# Patient Record
Sex: FEMALE | Race: OTHER | NOT HISPANIC OR LATINO | URBAN - METROPOLITAN AREA
[De-identification: names, ages, dates, MRNs, and addresses within clinical notes are randomized per-mention and may not be internally consistent; named-entity substitution may affect disease eponyms.]

---

## 2023-10-04 ENCOUNTER — EMERGENCY (EMERGENCY)
Facility: HOSPITAL | Age: 28
LOS: 0 days | Discharge: HOME | End: 2023-10-04
Attending: EMERGENCY MEDICINE
Payer: MEDICAID

## 2023-10-04 VITALS
WEIGHT: 119.93 LBS | SYSTOLIC BLOOD PRESSURE: 107 MMHG | TEMPERATURE: 99 F | RESPIRATION RATE: 18 BRPM | OXYGEN SATURATION: 98 % | HEIGHT: 61 IN | DIASTOLIC BLOOD PRESSURE: 68 MMHG | HEART RATE: 91 BPM

## 2023-10-04 VITALS
DIASTOLIC BLOOD PRESSURE: 59 MMHG | RESPIRATION RATE: 18 BRPM | HEART RATE: 80 BPM | SYSTOLIC BLOOD PRESSURE: 109 MMHG | TEMPERATURE: 99 F | OXYGEN SATURATION: 100 %

## 2023-10-04 DIAGNOSIS — O99.341 OTHER MENTAL DISORDERS COMPLICATING PREGNANCY, FIRST TRIMESTER: ICD-10-CM

## 2023-10-04 DIAGNOSIS — F32.A DEPRESSION, UNSPECIFIED: ICD-10-CM

## 2023-10-04 DIAGNOSIS — O00.90 UNSPECIFIED ECTOPIC PREGNANCY WITHOUT INTRAUTERINE PREGNANCY: ICD-10-CM

## 2023-10-04 DIAGNOSIS — Z90.49 ACQUIRED ABSENCE OF OTHER SPECIFIED PARTS OF DIGESTIVE TRACT: ICD-10-CM

## 2023-10-04 DIAGNOSIS — J45.909 UNSPECIFIED ASTHMA, UNCOMPLICATED: ICD-10-CM

## 2023-10-04 DIAGNOSIS — Z90.79 ACQUIRED ABSENCE OF OTHER GENITAL ORGAN(S): ICD-10-CM

## 2023-10-04 DIAGNOSIS — O99.511 DISEASES OF THE RESPIRATORY SYSTEM COMPLICATING PREGNANCY, FIRST TRIMESTER: ICD-10-CM

## 2023-10-04 DIAGNOSIS — O20.9 HEMORRHAGE IN EARLY PREGNANCY, UNSPECIFIED: ICD-10-CM

## 2023-10-04 DIAGNOSIS — F41.9 ANXIETY DISORDER, UNSPECIFIED: ICD-10-CM

## 2023-10-04 LAB
ALBUMIN SERPL ELPH-MCNC: 4.7 G/DL — SIGNIFICANT CHANGE UP (ref 3.5–5.2)
ALP SERPL-CCNC: 43 U/L — SIGNIFICANT CHANGE UP (ref 30–115)
ALT FLD-CCNC: 16 U/L — SIGNIFICANT CHANGE UP (ref 0–41)
ANION GAP SERPL CALC-SCNC: 8 MMOL/L — SIGNIFICANT CHANGE UP (ref 7–14)
APPEARANCE UR: ABNORMAL
AST SERPL-CCNC: 17 U/L — SIGNIFICANT CHANGE UP (ref 0–41)
BASOPHILS # BLD AUTO: 0.04 K/UL — SIGNIFICANT CHANGE UP (ref 0–0.2)
BASOPHILS NFR BLD AUTO: 0.7 % — SIGNIFICANT CHANGE UP (ref 0–1)
BILIRUB SERPL-MCNC: 0.3 MG/DL — SIGNIFICANT CHANGE UP (ref 0.2–1.2)
BILIRUB UR-MCNC: NEGATIVE — SIGNIFICANT CHANGE UP
BLD GP AB SCN SERPL QL: SIGNIFICANT CHANGE UP
BUN SERPL-MCNC: 6 MG/DL — LOW (ref 10–20)
CALCIUM SERPL-MCNC: 8.7 MG/DL — SIGNIFICANT CHANGE UP (ref 8.4–10.5)
CHLORIDE SERPL-SCNC: 105 MMOL/L — SIGNIFICANT CHANGE UP (ref 98–110)
CO2 SERPL-SCNC: 23 MMOL/L — SIGNIFICANT CHANGE UP (ref 17–32)
COLOR SPEC: ABNORMAL
CREAT SERPL-MCNC: 0.7 MG/DL — SIGNIFICANT CHANGE UP (ref 0.7–1.5)
DIFF PNL FLD: ABNORMAL
EGFR: 121 ML/MIN/1.73M2 — SIGNIFICANT CHANGE UP
EOSINOPHIL # BLD AUTO: 0.2 K/UL — SIGNIFICANT CHANGE UP (ref 0–0.7)
EOSINOPHIL NFR BLD AUTO: 3.6 % — SIGNIFICANT CHANGE UP (ref 0–8)
EPI CELLS # UR: PRESENT
GLUCOSE SERPL-MCNC: 95 MG/DL — SIGNIFICANT CHANGE UP (ref 70–99)
GLUCOSE UR QL: NEGATIVE MG/DL — SIGNIFICANT CHANGE UP
HCG SERPL-ACNC: 1049 MIU/ML — HIGH
HCT VFR BLD CALC: 35.1 % — LOW (ref 37–47)
HGB BLD-MCNC: 12 G/DL — SIGNIFICANT CHANGE UP (ref 12–16)
IMM GRANULOCYTES NFR BLD AUTO: 0.2 % — SIGNIFICANT CHANGE UP (ref 0.1–0.3)
KETONES UR-MCNC: ABNORMAL MG/DL
LEUKOCYTE ESTERASE UR-ACNC: ABNORMAL
LIDOCAIN IGE QN: 17 U/L — SIGNIFICANT CHANGE UP (ref 7–60)
LYMPHOCYTES # BLD AUTO: 0.68 K/UL — LOW (ref 1.2–3.4)
LYMPHOCYTES # BLD AUTO: 12.3 % — LOW (ref 20.5–51.1)
MCHC RBC-ENTMCNC: 33.8 PG — HIGH (ref 27–31)
MCHC RBC-ENTMCNC: 34.2 G/DL — SIGNIFICANT CHANGE UP (ref 32–37)
MCV RBC AUTO: 98.9 FL — SIGNIFICANT CHANGE UP (ref 81–99)
MONOCYTES # BLD AUTO: 0.53 K/UL — SIGNIFICANT CHANGE UP (ref 0.1–0.6)
MONOCYTES NFR BLD AUTO: 9.6 % — HIGH (ref 1.7–9.3)
NEUTROPHILS # BLD AUTO: 4.05 K/UL — SIGNIFICANT CHANGE UP (ref 1.4–6.5)
NEUTROPHILS NFR BLD AUTO: 73.6 % — SIGNIFICANT CHANGE UP (ref 42.2–75.2)
NITRITE UR-MCNC: NEGATIVE — SIGNIFICANT CHANGE UP
NRBC # BLD: 0 /100 WBCS — SIGNIFICANT CHANGE UP (ref 0–0)
PH UR: 6 — SIGNIFICANT CHANGE UP (ref 5–8)
PLATELET # BLD AUTO: 182 K/UL — SIGNIFICANT CHANGE UP (ref 130–400)
PMV BLD: 11.2 FL — HIGH (ref 7.4–10.4)
POTASSIUM SERPL-MCNC: 4.2 MMOL/L — SIGNIFICANT CHANGE UP (ref 3.5–5)
POTASSIUM SERPL-SCNC: 4.2 MMOL/L — SIGNIFICANT CHANGE UP (ref 3.5–5)
PROT SERPL-MCNC: 6.8 G/DL — SIGNIFICANT CHANGE UP (ref 6–8)
PROT UR-MCNC: 30 MG/DL
RBC # BLD: 3.55 M/UL — LOW (ref 4.2–5.4)
RBC # FLD: 11.7 % — SIGNIFICANT CHANGE UP (ref 11.5–14.5)
RBC CASTS # UR COMP ASSIST: 400 /HPF — HIGH (ref 0–4)
SODIUM SERPL-SCNC: 136 MMOL/L — SIGNIFICANT CHANGE UP (ref 135–146)
SP GR SPEC: 1.03 — SIGNIFICANT CHANGE UP (ref 1–1.03)
UROBILINOGEN FLD QL: 1 MG/DL — SIGNIFICANT CHANGE UP (ref 0.2–1)
WBC # BLD: 5.51 K/UL — SIGNIFICANT CHANGE UP (ref 4.8–10.8)
WBC # FLD AUTO: 5.51 K/UL — SIGNIFICANT CHANGE UP (ref 4.8–10.8)
WBC UR QL: 5 /HPF — SIGNIFICANT CHANGE UP (ref 0–5)

## 2023-10-04 PROCEDURE — 96401 CHEMO ANTI-NEOPL SQ/IM: CPT

## 2023-10-04 PROCEDURE — 99284 EMERGENCY DEPT VISIT MOD MDM: CPT

## 2023-10-04 PROCEDURE — 85025 COMPLETE CBC W/AUTO DIFF WBC: CPT

## 2023-10-04 PROCEDURE — 86900 BLOOD TYPING SEROLOGIC ABO: CPT

## 2023-10-04 PROCEDURE — 84702 CHORIONIC GONADOTROPIN TEST: CPT

## 2023-10-04 PROCEDURE — 99284 EMERGENCY DEPT VISIT MOD MDM: CPT | Mod: 25

## 2023-10-04 PROCEDURE — 86901 BLOOD TYPING SEROLOGIC RH(D): CPT

## 2023-10-04 PROCEDURE — 86850 RBC ANTIBODY SCREEN: CPT

## 2023-10-04 PROCEDURE — 76830 TRANSVAGINAL US NON-OB: CPT

## 2023-10-04 PROCEDURE — 83690 ASSAY OF LIPASE: CPT

## 2023-10-04 PROCEDURE — 36415 COLL VENOUS BLD VENIPUNCTURE: CPT

## 2023-10-04 PROCEDURE — 80053 COMPREHEN METABOLIC PANEL: CPT

## 2023-10-04 PROCEDURE — 99282 EMERGENCY DEPT VISIT SF MDM: CPT

## 2023-10-04 PROCEDURE — 81001 URINALYSIS AUTO W/SCOPE: CPT

## 2023-10-04 PROCEDURE — 76830 TRANSVAGINAL US NON-OB: CPT | Mod: 26

## 2023-10-04 RX ORDER — METHOTREXATE 2.5 MG/1
76 TABLET ORAL ONCE
Refills: 0 | Status: COMPLETED | OUTPATIENT
Start: 2023-10-04 | End: 2023-10-04

## 2023-10-04 RX ADMIN — METHOTREXATE 76 MILLIGRAM(S): 2.5 TABLET ORAL at 16:50

## 2023-10-04 NOTE — CONSULT NOTE ADULT - ASSESSMENT
A/P: 29 yo  @5w6d, h/o L salpingectomy, asthma, anxiety/depression, with right adnexal ectopic pregnancy, clinically and hemodynamically stable  -Discussed management options for ectopic pregnancy with patient, including surgical management with salpingectomy and medical management with Methotrexate.   -Explained that surgical management is the definitive treatment, but medical management may be acceptable in some patients. Discussed that surgical management would like to sterilization due to patient's history of L salpingectomy, and she would need to undergo IVF to achieve additional pregnancies in the future. Discussed that without surgery this is a risk for ectopic rupture with severe morbidity and mortality.   -Explained that methotrexate may be considered in select patients. Discussed that medication management requires close follow up and patient compliance is necessary. Discussed that even with medical management, patient's fallopian tube is likely damaged and she will be at high risk for ectopic pregnancy in the future. Discussed that medical management may fail and patient may need surgical management in the future. Discussed that a second dose of methotrexate is necessary in some patients to achieve therapeutic effect.   -Discussed side effects of methotrexate, need to avoid pregnancy for 3 mo after administration.  -Methotrexate agreement form signed  -Methotrexate consent signed  -Pt to repeat HCG on 10/7 (day 4) and 10/10 (day 7)  -ectopic precautions discussed  -recommend patient re-start paroxetine    INCOMPLETE    Dr. Mayo and Dr. Leach aware A/P: 27 yo  @5w6d, h/o L salpingectomy, asthma, anxiety/depression, with right adnexal ectopic pregnancy, clinically and hemodynamically stable  -Discussed management options for ectopic pregnancy with patient, including surgical management with salpingectomy and medical management with Methotrexate.   -Explained that surgical management is the definitive treatment, but medical management may be acceptable in some patients. Discussed that surgical management would like to sterilization due to patient's history of L salpingectomy, and she would need to undergo IVF to achieve additional pregnancies in the future. Discussed that without surgery this is a risk for ectopic rupture with severe morbidity and mortality.   -Explained that methotrexate may be considered in select patients. Discussed that medication management requires close follow up and patient compliance is necessary. Discussed that even with medical management, patient's fallopian tube is likely damaged and she will be at high risk for ectopic pregnancy in the future. Discussed that medical management may fail and patient may need surgical management in the future. Discussed that a second dose of methotrexate is necessary in some patients to achieve therapeutic effect.   -Discussed side effects of methotrexate, need to avoid pregnancy for 3 mo after administration.  -Methotrexate agreement form signed  -Methotrexate consent signed  -76mg IM methotrexate ordered, please administer  -Pt to repeat HCG on 10/7 (day 4) and 10/10 (day 7)  -ectopic precautions discussed  -recommend patient re-start paroxetine  -pt to follow up at [facility] in 2-3 weeks      Dr. Mayo and Dr. Leach aware

## 2023-10-04 NOTE — ED PROVIDER NOTE - PHYSICAL EXAMINATION
VITAL SIGNS: I have reviewed nursing notes and confirm.  CONSTITUTIONAL: Patient is in no acute distress.  SKIN: Skin exam is warm and dry, no acute rash.  HEAD: Normocephalic; atraumatic.  EYES: EOM intact; conjunctiva and sclera clear.  ENT: No nasal discharge; airway clear.    CARD: S1, S2 normal; Regular rate and rhythm.  RESP: Clear to auscultation bilaterally. No wheezes, rales or rhonchi.  ABD: Soft; non-distended; non-tender.   EXT: Normal ROM. No edema.   NEURO: Alert, oriented. Grossly unremarkable. No focal deficits.  PSYCH: Cooperative, appropriate.

## 2023-10-04 NOTE — ED PROVIDER NOTE - OBJECTIVE STATEMENT
29 yo F A1 with pmh of ectopic pregnancy, endometriosis and asthma presents to the ER with c.o vaginal bleeding x . Reports she went to urgent care today due to right sided pelvic cramping with vaginal bleeding, was told she is pregnant. Has changed 2 pads today. States she typically gets her menses at the end of every month. Does not take OCP, no IUD. Pt reports hx of ectopic pregnancy 2-3 yrs ago. Denies dysuria, fever, chills, vomiting or other complaints. 27 yo  @5w6d by LMP , with h/o left salpingectomy for ruptured ectopic (3 yrs ago at [facility]), h/o cholecystectomy, asthma, anxiety and depression, presents to ED for vaginal bleeding x . Reports she went to urgent care today due to right sided pelvic cramping with vaginal bleeding, was told she is pregnant. Has changed 2 pads today. States she typically gets her menses at the end of every month. Does not take OCP, no IUD. Pt reports hx of ectopic pregnancy 2-3 yrs ago. Denies dysuria, fever, chills, vomiting or other complaints.

## 2023-10-04 NOTE — ED PROVIDER NOTE - CLINICAL SUMMARY MEDICAL DECISION MAKING FREE TEXT BOX
28-year-old female history of prior ectopic pregnancy, , presenting for evaluation of vaginal bleeding with associated lower abdominal cramping.  No other acute complaints.  Well appearing, NAD, non toxic. NCAT PERRLA EOMI neck supple non tender normal wob cta bl rrr abdomen s nt nd no rebound no guarding WWPx4 neuro non focal.  Diarrhea.  Status post OB/GYN evaluation.  Methotrexate given.  Aware of all results, given a copy of all available results, comfortable with discharge and follow-up outpatient, strict return precautions given. Endorses understanding and aware they can return at any time for further evaluation. No further questions or concerns at this time.

## 2023-10-04 NOTE — CONSULT NOTE ADULT - ATTENDING COMMENTS
27 y/o  @ 5w6d with history of prior ectopic pregnancy s/p left salpingectomy, currently with right sided ectopic pregnancy, stable. Patient counselled on her options and strongly desires to preserve her fallopian tube for future fertility. Risks/benefits discussed with patient. Will administer methotrexate and trend beta hcg to monitor for resolution of the ectopic. Patient is aware that even if medical management is successful, she will still be at risk of another ectopic. Patient understands and agreed. Follow up for serial beta hCG as instructed. Strict precautions discussed.

## 2023-10-04 NOTE — ED PROVIDER NOTE - NS ED ROS FT
Review of Systems:  	•	CONSTITUTIONAL - no fever, no diaphoresis, no chills  	•	SKIN - no rash  	•	HEMATOLOGIC - no bleeding, no bruising  	•	EYES - no eye pain, no blurry vision  	•	ENT - no congestion  	•	RESPIRATORY - no shortness of breath, no cough  	•	CARDIAC - no chest pain, no palpitations  	•	GI - no abd pain, no nausea, no vomiting, no diarrhea   	•	GENITO-URINARY - no dysuria; no hematuria, +Vaginal bleeding  	•	MUSCULOSKELETAL - no joint paint, no swelling, no redness  	•	NEUROLOGIC - no weakness, no headache   	•	PSYCH - no anxiety, no depression  	All other ROS are negative except as documented in HPI.

## 2023-10-04 NOTE — CONSULT NOTE ADULT - SUBJECTIVE AND OBJECTIVE BOX
PGY2 Note  Chief Complaint: vaginal bleeding    HPI: 27 yo  @5w6d by LMP , with h/o left salpingectomy for ruptured ectopic, h/o cholecystectomy, asthma, anxiety and depression, presents to ED for vaginal bleeding. Pt reports she began having vaginal bleeding on . She went to urgent care today due to mild abdominal cramping and vaginal bleeding thought to be her period. There she was told she was pregnant and referred to ED. Pt reports abdominal pain began 3 days ago, mild, crampy with localization to the right side. Pain improved with rest, aggregated by movement. Denies fever, chills, N/V, diarrhea, constipation, dizziness, syncope. Pt reports regular periods. Reports h/o ruptured left ectopic pregnancy with emergency laparoscopy and salpingectomy approximately 3 years ago in [place]. Pt reports she follow with [facility] in [place], visiting her boyfriend on [place] today. Denies h/o fibroids, cysts, STI, abnormal pap. Reports h/o asthma, poorly controlled. Uses albuterol 4x per day, h/o hospitalization x1 without intubation. Reports h/o anxiety/depression, taking Paroxetine 10mg daily, however self-discontinued medication 3 days ago.     Denies the following: constitutional symptoms, visual symptoms, cardiovascular symptoms, respiratory symptoms, GI symptoms, musculoskeletal symptoms, skin symptoms, neurologic symptoms, hematologic symptoms, allergic symptoms, psychiatric symptoms  Except any pertinent positives listed.     Ob/Gyn History:                   LMP -                   Cycle Length - regular  Denies history of ovarian cysts, uterine fibroids, abnormal paps, or STIs    Home Medications:  Albuterol  Paroxetine      Allergies  No Known Allergies  Intolerances    PAST MEDICAL & SURGICAL HISTORY:  H/o left salpingectomy  H/o cholecystectomy  asthma  anxiety/depression    SOCIAL HISTORY: Denies cigarette use, alcohol use, or illicit drug use    Vital Signs Last 24 Hrs  T(F): 98.9 (04 Oct 2023 10:11), Max: 98.9 (04 Oct 2023 10:11)  HR: 91 (04 Oct 2023 10:11) (91 - 91)  BP: 107/68 (04 Oct 2023 10:11) (107/68 - 107/68)  RR: 18 (04 Oct 2023 10:11) (18 - 18)  Height (cm): 154.9 (10-04-23 @ 10:11)  Weight (kg): 54.4 (10-04-23 @ 10:11)  BMI (kg/m2): 22.7 (10-04-23 @ 10:11)  BSA (m2): 1.52 (10-04-23 @ 10:11)    General Appearance - AAOx3, NAD  Abdomen - Soft, mild tenderness with rebound in RLQ, nondistended,, no rigidity, no guarding, bowel sounds present    GYN/Pelvis:  External - normal  Internal -normal vagina and cervix, 100 red blood in cervix  Uterus - 6 week sized, non tender  Adnexa - non palpable and non tender bilaterally    Meds:     Height (cm): 154.9 (10-04-23 @ 10:11)  Weight (kg): 54.4 (10-04-23 @ 10:11)  BMI (kg/m2): 22.7 (10-04-23 @ 10:11)  BSA (m2): 1.52 (10-04-23 @ 10:11)    LABS:                        12.0   5.51  )-----------( 182      ( 04 Oct 2023 11:40 )             35.1     HCG Quantitative, Serum: 1049.0 mIU/mL (10-04-23 @ 11:40)    ABO RH Interpretation: O POS (10-04-23 @ 11:40)  Antibody Screen: NEG (10-04-23 @ 11:40)    10-04    136  |  105  |  6<L>  ----------------------------<  95  4.2   |  23  |  0.7    Ca    8.7      04 Oct 2023 11:40    TPro  6.8  /  Alb  4.7  /  TBili  0.3  /  DBili  x   /  AST  17  /  ALT  16  /  AlkPhos  43  10-04      Urinalysis Basic - ( 04 Oct 2023 11:40 )    Color: Orange / Appearance: Turbid / S.030 / pH: x  Gluc: 95 mg/dL / Ketone: Trace mg/dL  / Bili: Negative / Urobili: 1.0 mg/dL   Blood: x / Protein: 30 mg/dL / Nitrite: Negative   Leuk Esterase: Small / RBC: 400 /HPF / WBC 5 /HPF   Sq Epi: x / Non Sq Epi: x / Bacteria: x    RADIOLOGY & ADDITIONAL STUDIES:  < from: US Transvaginal (10.04.23 @ 12:45) >  PROCEDURE DATE:  10/04/2023          INTERPRETATION:  CLINICAL INFORMATION: Vaginal bleeding.    LMP: Patient undergoing severe vaginal bleeding at this time    COMPARISON: None available.    TECHNIQUE:  Endovaginal pelvic sonogram only. Color and Spectral Doppler was   performed.    FINDINGS:  Uterus: 6.1 x 4.4 x 1.7 cm. Within normal limits.  Endometrium: Gestational sac is seen with a thickened fluid-filled   endometrium.    Right ovary: 2.6 cm x 2.3 cm x 2.5 cm. Within the right adnexa there is a   1.4 cm x 1.6 cm rounded structure with typical sonographic   characteristics consistent with an ectopic pregnancy.  Left ovary: 3.3 cm x 2.5 cm x 2.8 cm. Within normal limits.    Fluid: None.    IMPRESSION:  Right adnexal ectopic pregnancy.

## 2023-10-04 NOTE — ED ADULT NURSE NOTE - OBJECTIVE STATEMENT
Patient reports vaginal spotting. Patient had 2 positive pregnancy test. Patient tested positive today at urgent care. Patient reports lower abdominal cramping.

## 2023-10-04 NOTE — ED PROVIDER NOTE - NSFOLLOWUPINSTRUCTIONS_ED_ALL_ED_FT
Ectopic Pregnancy    WHAT YOU NEED TO KNOW:    Ectopic pregnancy occurs when a fertilized egg attaches and begins to grow outside of the uterus. The most common place for this to happen is in the fallopian tube. This is sometimes called a tubal pregnancy. The egg can also implant on the outside of the uterus, on the ovary or cervix, or in the abdomen. The egg may begin to grow, but the pregnancy cannot continue normally. Ectopic pregnancy can cause heavy bleeding and may be life-threatening.    Ectopic Pregnancy         DISCHARGE INSTRUCTIONS:    Call your local emergency number (911 in the [place]) if:     You have chest pain or trouble breathing.        Call your doctor if:     You have sharp pain in your lower abdomen that is severe and starts suddenly.      You feel lightheaded or like you are going to faint.      You have increasing abdominal or pelvic pain or heavy vaginal bleeding.      You have shoulder pain.      You have a fever.      You have questions or concerns about your condition or care.    Medicines: You may need any of the following:     Prescription pain medicine may be given. Ask your healthcare provider how to take this medicine safely. Some prescription pain medicines contain acetaminophen. Do not take other medicines that contain acetaminophen without talking to your healthcare provider. Too much acetaminophen may cause liver damage. Prescription pain medicine may cause constipation. Ask your healthcare provider how to prevent or treat constipation.       Acetaminophen decreases pain and fever. It is available without a doctor's order. Ask how much to take and how often to take it. Follow directions. Read the labels of all other medicines you are using to see if they also contain acetaminophen, or ask your doctor or pharmacist. Acetaminophen can cause liver damage if not taken correctly. Do not use more than 4 grams (4,000 milligrams) total of acetaminophen in one day.       Take your medicine as directed. Contact your healthcare provider if you think your medicine is not helping or if you have side effects. Tell him or her if you are allergic to any medicine. Keep a list of the medicines, vitamins, and herbs you take. Include the amounts, and when and why you take them. Bring the list or the pill bottles to follow-up visits. Carry your medicine list with you in case of an emergency.    If you received methotrexate:     Do not have sex, and limit physical activity. Heavy physical activity or sexual intercourse may cause the ectopic pregnancy to rupture. This can be life-threatening. Your healthcare provider will tell you when it is okay to have sex and return to your normal activities.      Do not get pregnant until your healthcare provider says it is okay. Methotrexate will be harmful to an unborn baby. You will need to wait until at least 1 monthly cycle after you finish the methotrexate course to get pregnant. Your provider may want you to wait until after you have had 3 monthly cycles. This will help make sure all the methotrexate is out of your body.      Avoid folic acid and NSAID medicines. Folic acid, and NSAIDs, such as ibuprofen, prevent methotrexate from working correctly. Do not take folic acid supplements or have foods that are high in folic acid. Examples include orange juice, breakfast cereal, and leafy green vegetables. Your healthcare provider can give you more information on foods to avoid.      You may have some spotting and abdominal pain. This may start a few days after you begin taking methotrexate. These are normal and should only last a short time. Talk to your healthcare provider if you have any concerns.      Limit sunlight exposure. Sunlight can cause a condition caused methotrexate dermatitis (skin irritation).    For support and more information:     The [employer]  [address] [address]  [place] [address]  Phone: [phone]  Phone: 1-357.860.9559  Web Address: http://www.acog.org      Follow up with your gynecologist as directed: You may need to return for a follow-up exam, treatment, or blood tests. If you received methotrexate to stop your pregnancy, it is important to come in for follow-up tests. Write down your questions so you remember to ask them during your visits.

## 2023-10-04 NOTE — ED PROVIDER NOTE - NS ED ATTENDING STATEMENT MOD
This was a shared visit with the LITA. I reviewed and verified the documentation and independently performed the documented:

## 2023-10-04 NOTE — ED ADULT NURSE NOTE - NSFALLUNIVINTERV_ED_ALL_ED
Bed/Stretcher in lowest position, wheels locked, appropriate side rails in place/Call bell, personal items and telephone in reach/Instruct patient to call for assistance before getting out of bed/chair/stretcher/Non-slip footwear applied when patient is off stretcher/[place] to call system/Physically safe environment - no spills, clutter or unnecessary equipment/Purposeful proactive rounding/Room/bathroom lighting operational, light cord in reach

## 2023-10-04 NOTE — ED PROVIDER NOTE - PATIENT PORTAL LINK FT
You can access the FollowMyHealth Patient Portal offered by [facility] by registering at the following website: [email or web address]. By joining [employer]’s FollowMyHealth portal, you will also be able to view your health information using other applications (apps) compatible with our system.

## 2023-10-04 NOTE — ED ADULT TRIAGE NOTE - CHIEF COMPLAINT QUOTE
Patient reports vaginal bleeding x 1 week, positive pregnancy test today. Patient reports lower abdominal cramping.

## 2023-10-11 NOTE — CHART NOTE - NSCHARTNOTEFT_GEN_A_CORE
PGY 2 note    Called patient to check in following methotrexate administration yesterday for ectopic pregnancy. Pt reports she is doing well, that she has mild right sided cramping intermittently, however no severe pain. Denies heavy vaginal bleeding.     Ectopic precautions reviewed. Patient given call back number [phone] to call for questions or concerns.     Dr. Posada to be aware
PGY2 Note     Spoke with patient regarding bhcg results.  Patient doing well, asymptomatic.  Will repeat bhcg 10/10 (day 7 after MTX)
PGY3 NOTE    Called patient from beta-list, Reports she is doing well, taking tylenol PRN for pain and it is improving. Went for repeat beta-hcg today, will follow up results.
PGY2 note    Patient called L/D around 0100 complaining of pain and vaginal bleeding. She states that her abdominal pain was an 8/10, similar to her pain during her ER visit on 10/4, mild improvement with Tylenol. She also endorses vaginal bleeding, spotting. Patient was told that a complete evaluation could not be done over the phone and recommended she come to the ED for further evaluation. Ectopic precautions given, explained the risks of ruptured ectopic. Patient states she will come to the ED.     Dr. Posada and Dr. Matthews to be aware
PGY3 NOTE    Called patient, reports she is doing well, minimal pain relieved by tylenol. Reports some vaginal bleeding but is not soaking through any pads. Is on her way to the lab to repeat her beta-hcg.
PGY3 NOTE    Spoke with patient on the phone, reports she is doing well. Beta-hcg dropped between day 4 and day 7 by 55.8%, adequate. Will repeat again in 1 week on 10/17. Will fax a script to  [place] [id number] [facility] .

## 2023-10-12 ENCOUNTER — INPATIENT (INPATIENT)
Facility: HOSPITAL | Age: 28
LOS: 0 days | Discharge: HOME | DRG: 545 | End: 2023-10-12
Attending: OBSTETRICS & GYNECOLOGY | Admitting: OBSTETRICS & GYNECOLOGY
Payer: MEDICAID

## 2023-10-12 VITALS
TEMPERATURE: 98 F | HEART RATE: 81 BPM | WEIGHT: 125 LBS | DIASTOLIC BLOOD PRESSURE: 63 MMHG | SYSTOLIC BLOOD PRESSURE: 110 MMHG | OXYGEN SATURATION: 97 % | RESPIRATION RATE: 20 BRPM | HEIGHT: 61 IN

## 2023-10-12 VITALS
RESPIRATION RATE: 18 BRPM | SYSTOLIC BLOOD PRESSURE: 116 MMHG | OXYGEN SATURATION: 99 % | DIASTOLIC BLOOD PRESSURE: 61 MMHG | HEART RATE: 64 BPM

## 2023-10-12 DIAGNOSIS — Z98.890 OTHER SPECIFIED POSTPROCEDURAL STATES: Chronic | ICD-10-CM

## 2023-10-12 DIAGNOSIS — Z90.79 ACQUIRED ABSENCE OF OTHER GENITAL ORGAN(S): Chronic | ICD-10-CM

## 2023-10-12 DIAGNOSIS — N93.8 OTHER SPECIFIED ABNORMAL UTERINE AND VAGINAL BLEEDING: ICD-10-CM

## 2023-10-12 DIAGNOSIS — Z90.49 ACQUIRED ABSENCE OF OTHER SPECIFIED PARTS OF DIGESTIVE TRACT: Chronic | ICD-10-CM

## 2023-10-12 LAB
HCG SERPL-ACNC: 232.5 MIU/ML — HIGH
HCT VFR BLD CALC: 33.6 % — LOW (ref 37–47)
HGB BLD-MCNC: 11.6 G/DL — LOW (ref 12–16)
MCHC RBC-ENTMCNC: 34.5 G/DL — SIGNIFICANT CHANGE UP (ref 32–37)
MCHC RBC-ENTMCNC: 34.6 PG — HIGH (ref 27–31)
MCV RBC AUTO: 100.3 FL — HIGH (ref 81–99)
NRBC # BLD: 0 /100 WBCS — SIGNIFICANT CHANGE UP (ref 0–0)
PLATELET # BLD AUTO: 183 K/UL — SIGNIFICANT CHANGE UP (ref 130–400)
PMV BLD: 10.7 FL — HIGH (ref 7.4–10.4)
RBC # BLD: 3.35 M/UL — LOW (ref 4.2–5.4)
RBC # FLD: 11.9 % — SIGNIFICANT CHANGE UP (ref 11.5–14.5)
WBC # BLD: 9.3 K/UL — SIGNIFICANT CHANGE UP (ref 4.8–10.8)
WBC # FLD AUTO: 9.3 K/UL — SIGNIFICANT CHANGE UP (ref 4.8–10.8)

## 2023-10-12 PROCEDURE — 36415 COLL VENOUS BLD VENIPUNCTURE: CPT

## 2023-10-12 PROCEDURE — 99285 EMERGENCY DEPT VISIT HI MDM: CPT

## 2023-10-12 PROCEDURE — 88305 TISSUE EXAM BY PATHOLOGIST: CPT | Mod: 26

## 2023-10-12 PROCEDURE — 59151 TREAT ECTOPIC PREGNANCY: CPT

## 2023-10-12 PROCEDURE — 76830 TRANSVAGINAL US NON-OB: CPT | Mod: 26

## 2023-10-12 PROCEDURE — C9399: CPT

## 2023-10-12 PROCEDURE — 88305 TISSUE EXAM BY PATHOLOGIST: CPT

## 2023-10-12 PROCEDURE — 80053 COMPREHEN METABOLIC PANEL: CPT

## 2023-10-12 PROCEDURE — 99223 1ST HOSP IP/OBS HIGH 75: CPT | Mod: 25,57

## 2023-10-12 RX ORDER — SODIUM CHLORIDE 9 MG/ML
1000 INJECTION INTRAMUSCULAR; INTRAVENOUS; SUBCUTANEOUS
Refills: 0 | Status: DISCONTINUED | OUTPATIENT
Start: 2023-10-12 | End: 2023-10-12

## 2023-10-12 RX ORDER — OXYCODONE HYDROCHLORIDE 5 MG/1
1 TABLET ORAL
Qty: 16 | Refills: 0
Start: 2023-10-12 | End: 2023-10-15

## 2023-10-12 RX ORDER — HYDROMORPHONE HYDROCHLORIDE 2 MG/ML
0.5 INJECTION INTRAMUSCULAR; INTRAVENOUS; SUBCUTANEOUS
Refills: 0 | Status: DISCONTINUED | OUTPATIENT
Start: 2023-10-12 | End: 2023-10-12

## 2023-10-12 RX ORDER — ACETAMINOPHEN 500 MG
650 TABLET ORAL ONCE
Refills: 0 | Status: DISCONTINUED | OUTPATIENT
Start: 2023-10-12 | End: 2023-10-12

## 2023-10-12 RX ORDER — ALBUTEROL 90 UG/1
2 AEROSOL, METERED ORAL
Refills: 0 | DISCHARGE

## 2023-10-12 RX ORDER — MORPHINE SULFATE 50 MG/1
4 CAPSULE, EXTENDED RELEASE ORAL ONCE
Refills: 0 | Status: DISCONTINUED | OUTPATIENT
Start: 2023-10-12 | End: 2023-10-12

## 2023-10-12 RX ORDER — SIMETHICONE 80 MG/1
1 TABLET, CHEWABLE ORAL
Qty: 42 | Refills: 0
Start: 2023-10-12 | End: 2023-10-18

## 2023-10-12 RX ADMIN — SODIUM CHLORIDE 100 MILLILITER(S): 9 INJECTION INTRAMUSCULAR; INTRAVENOUS; SUBCUTANEOUS at 09:06

## 2023-10-12 RX ADMIN — HYDROMORPHONE HYDROCHLORIDE 0.5 MILLIGRAM(S): 2 INJECTION INTRAMUSCULAR; INTRAVENOUS; SUBCUTANEOUS at 14:45

## 2023-10-12 RX ADMIN — MORPHINE SULFATE 4 MILLIGRAM(S): 50 CAPSULE, EXTENDED RELEASE ORAL at 09:06

## 2023-10-12 RX ADMIN — HYDROMORPHONE HYDROCHLORIDE 0.5 MILLIGRAM(S): 2 INJECTION INTRAMUSCULAR; INTRAVENOUS; SUBCUTANEOUS at 14:13

## 2023-10-12 NOTE — ED PROVIDER NOTE - OBJECTIVE STATEMENT
29yo female  by LMP  with PMH left salpingectomy for ruptured ectopic pregnancy 3 years ago, cholecystectomy, asthma, anxiety, and depression presents with right-sided abdominal and back pain and vaginal bleeding. Patient presented on 10/4 with cramping 27yo female  by LMP  with PMH left salpingectomy for ruptured ectopic pregnancy 3 years ago, cholecystectomy, asthma, anxiety, and depression presents with right-sided abdominal and back pain and vaginal bleeding. Patient presented on 10/4 with cramping and vaginal bleeding and was diagnosed with right adnexal ectopic pregnancy on ultrasound. Patient opted for methotrexate therapy over surgery to preserve fertility. 27yo female  by LMP  with PMH left salpingectomy for ruptured ectopic pregnancy 3 years ago, cholecystectomy, asthma, anxiety, and depression presents with right-sided abdominal and back pain and vaginal bleeding. Patient presented on 10/4 with cramping and vaginal bleeding and was diagnosed with right adnexal ectopic pregnancy on ultrasound. Patient opted for methotrexate therapy over surgery to preserve fertility. bHCG has been decreasing adequately 1049 on 10/4 ->  on 10/7-> 361 on 10/10, next check on 10/17. Patient reports she has been having vaginal bleeding and abdominal pain since 10/4, but bleeding was moderate and pain has been controlled with Tylenol. This morning at 4am patient reports waking up with intense right-sided abdominal pain that wraps around to her back, unrelieved by tylenol. She also reports heavy vaginal bleeding and has soaked through 2 pads this morning so far. She denies nausea or vomiting. 29yo female  by LMP  with PMH left salpingectomy for ruptured ectopic pregnancy 3 years ago, cholecystectomy, asthma, anxiety, and depression presents with right-sided abdominal and back pain and vaginal bleeding. Patient presented on 10/4 with cramping and vaginal bleeding and was diagnosed with right adnexal ectopic pregnancy on ultrasound. Patient opted for methotrexate therapy over surgery to preserve fertility. bhcg has been decreasing adequately 1049 on 10/4 ->  on 10/7-> 361 on 10/10, next check on 10/17. Patient reports she has been having vaginal bleeding and abdominal pain since 10/4, but bleeding was moderate and pain has been controlled with Tylenol. This morning at 4am patient reports waking up with intense right-sided abdominal pain that wraps around to her back, unrelieved by tylenol. She also reports heavy vaginal bleeding and has soaked through 2 pads this morning so far. She denies nausea or vomiting.

## 2023-10-12 NOTE — H&P ADULT - NSICDXPASTSURGICALHX_GEN_ALL_CORE_FT
PAST SURGICAL HISTORY:  History of cholecystectomy     History of repair of ACL     History of salpingectomy

## 2023-10-12 NOTE — BRIEF OPERATIVE NOTE - NSICDXBRIEFPROCEDURE_GEN_ALL_CORE_FT
PROCEDURES:  Salpingectomy, for ruptured ectopic pregnancy 12-Oct-2023 12:31:18  Sandy Green  Laparoscopy with salpingectomy 12-Oct-2023 12:31:30  Sandy Green

## 2023-10-12 NOTE — ED ADULT NURSE NOTE - OBJECTIVE STATEMENT
28 year old female, presenting to ED c/o abd pain. Pt c/o abd pain due to ectopic pregnancy. Denies n/v/d/fevers/chills. pt A&Ox4, ambulatory.

## 2023-10-12 NOTE — ED PROVIDER NOTE - ATTENDING CONTRIBUTION TO CARE
28-year-old female past medical history of ectopic pregnancy status post left salpingectomy couple of years ago, recently diagnosed with right ectopic pregnancy status post methotrexate treatment on 10//4 presenting to ED for evaluation of persistent vaginal bleeding and right-sided abdominal pain.  Patient states that bleeding became worse since yesterday.  She denies any dizziness lightheadedness, vomiting, black or bloody stools, fever chills or any other additional acute complaints. Well-nourished, well-appearing young female appears uncomfortable but in no acute distress, PERRL, pink conjunctiva, MMM, speaking full sentences, lungs clear to auscultation bilaterally, RRR, well-perfused extremities, abdomen soft, nondistended, right-sided tenderness to palpation, no leg edema, she is awake and alert x3, no gross neurodeficits.  Plan: Analgesia, hydration, labs, pelvic sono, OB/GYN consult.

## 2023-10-12 NOTE — ED ADULT NURSE NOTE - NSFALLUNIVINTERV_ED_ALL_ED
Bed/Stretcher in lowest position, wheels locked, appropriate side rails in place/Call bell, personal items and telephone in reach/Instruct patient to call for assistance before getting out of bed/chair/stretcher/Non-slip footwear applied when patient is off stretcher/[place] to call system/Physically safe environment - no spills, clutter or unnecessary equipment/Purposeful proactive rounding/Room/bathroom lighting operational, light cord in reach

## 2023-10-12 NOTE — ED PROVIDER NOTE - NSICDXPASTMEDICALHX_GEN_ALL_CORE_FT
PAST MEDICAL HISTORY:  Anxiety     Asthma     History of depression     Ruptured ectopic pregnancy

## 2023-10-12 NOTE — ASU DISCHARGE PLAN (ADULT/PEDIATRIC) - CARE PROVIDER_API CALL
Dhruv Posada  Obstetrics and Gynecology  [address] [place] [id number]  Phone: [phone]  Fax: [phone]  Follow Up Time: 2 weeks

## 2023-10-12 NOTE — H&P ADULT - NSHPPHYSICALEXAM_GEN_ALL_CORE
T(C): 36.8 (10-12-23 @ 07:36), Max: 36.8 (10-12-23 @ 07:36)  HR: 81 (10-12-23 @ 07:36) (81 - 81)  BP: 110/63 (10-12-23 @ 07:36) (110/63 - 110/63)  RR: 20 (10-12-23 @ 07:36) (20 - 20)  SpO2: 97% (10-12-23 @ 07:36) (97% - 97%)    Gen: NAD  Abd: soft, unable to elicit tenderness due to administration of Morphine  VE deferred pwe PMD  Ext: neg C/C/E T(C): 36.8 (10-12-23 @ 07:36), Max: 36.8 (10-12-23 @ 07:36)  HR: 81 (10-12-23 @ 07:36) (81 - 81)  BP: 110/63 (10-12-23 @ 07:36) (110/63 - 110/63)  RR: 20 (10-12-23 @ 07:36) (20 - 20)  SpO2: 97% (10-12-23 @ 07:36) (97% - 97%)    Gen: NAD  Abd: soft, unable to elicit tenderness due to administration of Morphine  Ext: neg C/C/E

## 2023-10-12 NOTE — BRIEF OPERATIVE NOTE - OPERATION/FINDINGS
Right ruptured ectopic pregnancy, hemoperitoneum  Normal appearing ovaries bilaterally   surgically absent L fallopian tubes

## 2023-10-12 NOTE — ASU DISCHARGE PLAN (ADULT/PEDIATRIC) - "IF YOU OR YOUR GUARDIAN/FAMILY IS A SMOKER, IT IS IMPORTANT FOR YOUR HEALTH TO STOP SMOKING. PLEASE BE AWARE THAT SECOND HAND SMOKE IS ALSO HARMFUL."
Called and requested refill(s): [facility]  Medications: hydrocodone   Amount: 90 day supply   Pharmacy to be sent to: [facility]   Call back number: [phone]   Okay to leave detailed voice message: yes    
Last office visit 10/25/22; Future office visit 6/19/23.    Norco last prescribed 10/26/22;qty 60 tabs.    Route to [facility] - script set up.  
Statement Selected

## 2023-10-12 NOTE — ED PROVIDER NOTE - CLINICAL SUMMARY MEDICAL DECISION MAKING FREE TEXT BOX
28-year-old female with suspected ruptured ectopic pregnancy.  Vital signs reviewed, patient is hemodynamically stable.  Labs were reviewed, imaging ordered and reviewed.  Management discussed with OB/GYN, patient was evaluated at bedside by OB/GYN attending, admitted to their service, with plans to go to OR for ex lap.

## 2023-10-12 NOTE — H&P ADULT - ATTENDING COMMENTS
possible ruptured ectopic  r/b of surgery  Risks: Anesthesia, infection, bleeding, transfusion, injury to other organs-removal of remaining tube would render pt unable to spontaneously conceive-would need IVF-pt understands this, salpingosoty-if even possible, leaves risk of future ectopic. Will make intraop decision. Risk of exlap, re-op discussed as well. Pt understands and consent signed

## 2023-10-12 NOTE — H&P ADULT - HISTORY OF PRESENT ILLNESS
Pt is a 28y.o.  s/p MTX 76mg IM on 10/4 for right ectopic pregnancy presents today c/o Right lower quadrant pain and back pain requiring Morphine and VB, "like a heavy period" Pt reports she woke up at 0430 am with severe rt lower quadrant pain radiating to back. . . [facility] [place] 10/4 1049, Day 4 817, Day 7 361. Sonogram done today shows right adnexal ectopic possibly ruptured, +FF and blood in cul-de-sac.     Pt has h/o Asthma on Albuterol PRN   h/o Anxiety/ Depression was on Paroxetine- was recommended to restart medication at last ED visit on 10/4

## 2023-10-12 NOTE — H&P ADULT - ASSESSMENT
28y.o.  s/p MTX on 10/4 for right ectopic pregnancy now with possible ruptured right adnexal ectopic, h/o left salpingectomy for ruptured ectopic, h/o Asthma, h/o Anxiety/ Depression  - Pt on call to OR for emergent diagnostic laparopcopy, possible right/left salpingectomy, possible oophorectomy, possible salpingostomy, possible exploratory laparoscopy  - R/B/A discussed with pt by Dr. Posada  - Dr. Posada aware

## 2023-10-12 NOTE — ED PROVIDER NOTE - PHYSICAL EXAMINATION
VITALS:   T(C): 36.8 (10-12-23 @ 07:36), Max: 36.8 (10-12-23 @ 07:36)  HR: 81 (10-12-23 @ 07:36) (81 - 81)  BP: 110/63 (10-12-23 @ 07:36) (110/63 - 110/63)  RR: 20 (10-12-23 @ 07:36) (20 - 20)  SpO2: 97% (10-12-23 @ 07:36) (97% - 97%)    GENERAL: appears uncomfortable, in pain   HEAD:  Atraumatic, normocephalic  EYES: EOMI, PERRLA  ENT: Moist mucous membranes  NECK: Supple  HEART: Regular rate and rhythm  LUNGS: Unlabored respirations.  Clear to auscultation bilaterally  ABDOMEN: Soft, nondistended. TTP in RLQ, RUQ, right inguinal area, right flank. No guarding.   EXTREMITIES: 2+ peripheral pulses bilaterally. No clubbing, cyanosis, or edema  NERVOUS SYSTEM:  A&Ox3, no focal deficits   SKIN: No rashes or lesions

## 2023-10-12 NOTE — H&P ADULT - NSHPLABSRESULTS_GEN_ALL_CORE
11.6   9.30  )-----------( 183      ( 12 Oct 2023 09:13 )             33.6     Beta  232  < from: US Transvaginal (10.12.23 @ 09:37) >      ACC: [id number] EXAM:  US TRANSVAGINAL   ORDERED BY: ANURADHA DAMON     PROCEDURE DATE:  10/12/2023          INTERPRETATION:  CLINICAL INFORMATION: Ectopic pregnancy.    LMP: Patient pregnant.    COMPARISON: October 4, 2023    TECHNIQUE:  Endovaginal pelvic sonogram only. Color and Spectral Doppler was   performed.    FINDINGS:  Uterus: 3.4 x 7.1 x 5 cm. 1 cm anterior fibroid.  Endometrium: 9 mm. Within normal limits.    Right ovary: 3.3 x 2 x 2.7 cm. Again identified within the right adnexa   is a 2.8 cm complex structure consistent with an ectopic pregnancy. This   is likely ruptured.  Left ovary: 3.4 x 2.6 x 3 cm. Within normal limits.    Fluid: Free fluid in the cul-de-sac, blood.    IMPRESSION:  Right adnexal ectopic, possibly ruptured.        --- End of Report ---                  ENEDELIA LAYNE MD; Attending Interventional Radiologist  This document has been electronically signed. Oct 12 2023  9:44AM    < end of copied text >

## 2023-10-17 LAB
SURGICAL PATHOLOGY STUDY: SIGNIFICANT CHANGE UP
SURGICAL PATHOLOGY STUDY: SIGNIFICANT CHANGE UP

## 2023-10-18 DIAGNOSIS — F32.9 MAJOR DEPRESSIVE DISORDER, SINGLE EPISODE, UNSPECIFIED: ICD-10-CM

## 2023-10-18 DIAGNOSIS — F41.9 ANXIETY DISORDER, UNSPECIFIED: ICD-10-CM

## 2023-10-18 DIAGNOSIS — N93.9 ABNORMAL UTERINE AND VAGINAL BLEEDING, UNSPECIFIED: ICD-10-CM

## 2023-10-18 DIAGNOSIS — J45.909 UNSPECIFIED ASTHMA, UNCOMPLICATED: ICD-10-CM

## 2023-10-18 DIAGNOSIS — O00.90 UNSPECIFIED ECTOPIC PREGNANCY WITHOUT INTRAUTERINE PREGNANCY: ICD-10-CM

## 2024-01-03 NOTE — ED ADULT TRIAGE NOTE - TEMPERATURE IN FAHRENHEIT (DEGREES F)
Nursing Home Discharge Summary  [facility]      Patient: Elissa Delacruz Date of Admission: 12/30/2023   YOB: 1953 Discharge Date: 1/4/2024   MR Number: [id number] Attending Physician: Balwinder Yu DO     Primary Care Provider:  Butch Jaramillo MD    Disposition:  Skilled nursing facility:  [facility]    Admitting Physician:  Evin Rodas MD   Discharge Physician:  Balwinder Yu DO    Primary Discharge Diagnoses:  1.  Closed fracture of left femur, unspecified fracture morphology, unspecified portion of femur, initial encounter (CMD)    Secondary Discharge Diagnoses:  Principal Problem:    Closed fracture of left femur, unspecified fracture morphology, unspecified portion of femur, initial encounter (CMD)  Active Problems:    Anxiety disorder  Resolved Problems:    * No resolved hospital problems. *      Hospital Course:  (See H&P for details of admission)      Left femoral diaphyseal fracture secondary to mechanical fall             -s/p external fixation of left femur on 12/31 followed by retrograde intramedullary nailing of left femur on 01/01             -nonweightbearing to left lower extremity             -continue DVT prophylaxis             -Tylenol 1 g t.i.d. x 3 more days then prn  -oxycodone 2.5 mg q.4 hours moderate pain, 5 mg q.4 hours severe pain  -Narcan prescription given     Acute hypoxic respiratory failure             -this was secondary to pain medications and has now resolved     Acute blood loss anemia secondary to fracture and surgery, expected             -initial hgb 13.1 and now 8.5 and stabilized   -repeat CBC on Monday 1/8     Essential hypertension             -continue Atacand 8 mg daily      Depression and anxiety             -continue Lexapro 20 mg daily   -continue Xanax 0.5 mg nightly p.r.n. but use cautiously while on oxycodone     Dyslipidemia  -continue Crestor 5 mg daily    Discharge Meds:  Please see the After Visit  Summary medication list.    Consultations:      Significant Diagnostic Studies and Procedures:   FL INTRAOPERATIVE C ARM NO REPORT    Result Date: 12/31/2023  Findings from this exam can be found in operative or procedural report section.     CT HEAD WO CONTRAST    Result Date: 12/30/2023  EXAM: CT HEAD WO CONTRAST, CT CERVICAL SPINE WO CONTRAST CLINICAL INDICATION: Fall. ? ICH. TECHNIQUE: Axial CT images were obtained through the head and cervical spine without the administration of intravenous contrast. Sagittal and coronal reformations were performed by the technologist and submitted to the radiologist for review. COMPARISON: None. FINDINGS: Head: No evidence for intracranial hemorrhage or abnormal extra-axial fluid collection. There is no evidence of mass or mass effect and the ventricular system is midline, without evidence for hydrocephalus. Size and configuration of the ventricles and sulci are reflective of age-appropriate intracranial volume loss. No significant white matter abnormality. The gray-white matter differentiation pattern is preserved. There is no CT evidence of acute ischemic infarction. The basal cisterns are patent. Allowing for beam hardening artifact, no abnormalities identified in the posterior fossa or brainstem.  The calvarium is intact. The visualized paranasal sinuses and mastoid air cells are clear. Cervical spine: No fracture or subluxation. Mild to moderate loss of disc space height from C4-5 through C6-7 with anterior and uncovertebral joint osteophyte formation at these levels. The disc space heights are otherwise preserved. The vertebral body heights are well-maintained. Normal anatomic alignment, including the craniocervical junction and atlantoaxial articulation. The facet joints articulate well with each other. No high-grade osseous spinal canal or neuroforaminal stenosis. The prevertebral soft tissues are normal. *Ligamentous, spinal cord and/or vascular abnormalities cannot  be excluded on the basis of this examination.     IMPRESSION: No acute intracranial findings. No cervical spine fracture or subluxation. Electronically Signed by: Balwinder Ash MD Signed on: 12/30/2023 11:40 PM Created on Workstation ID: [id number] Signed on Workstation ID: [id number]    CT CERVICAL SPINE WO CONTRAST    Result Date: 12/30/2023  EXAM: CT HEAD WO CONTRAST, CT CERVICAL SPINE WO CONTRAST CLINICAL INDICATION: Fall. ? ICH. TECHNIQUE: Axial CT images were obtained through the head and cervical spine without the administration of intravenous contrast. Sagittal and coronal reformations were performed by the technologist and submitted to the radiologist for review. COMPARISON: None. FINDINGS: Head: No evidence for intracranial hemorrhage or abnormal extra-axial fluid collection. There is no evidence of mass or mass effect and the ventricular system is midline, without evidence for hydrocephalus. Size and configuration of the ventricles and sulci are reflective of age-appropriate intracranial volume loss. No significant white matter abnormality. The gray-white matter differentiation pattern is preserved. There is no CT evidence of acute ischemic infarction. The basal cisterns are patent. Allowing for beam hardening artifact, no abnormalities identified in the posterior fossa or brainstem.  The calvarium is intact. The visualized paranasal sinuses and mastoid air cells are clear. Cervical spine: No fracture or subluxation. Mild to moderate loss of disc space height from C4-5 through C6-7 with anterior and uncovertebral joint osteophyte formation at these levels. The disc space heights are otherwise preserved. The vertebral body heights are well-maintained. Normal anatomic alignment, including the craniocervical junction and atlantoaxial articulation. The facet joints articulate well with each other. No high-grade osseous spinal canal or neuroforaminal stenosis. The prevertebral soft tissues are normal.  *Ligamentous, spinal cord and/or vascular abnormalities cannot be excluded on the basis of this examination.     IMPRESSION: No acute intracranial findings. No cervical spine fracture or subluxation. Electronically Signed by: Balwinder Ash MD Signed on: 12/30/2023 11:40 PM Created on Workstation ID: [id number] Signed on Workstation ID: [id number]    CTA CHEST PULMONARY EMBOLISM    Result Date: 12/30/2023  EXAM: CTA CHEST PULMONARY EMBOLISM CLINICAL INDICATION: Fall, hypoxemia. ? Fat emboli. COMPARISON: CT chest on 12/14/2022. TECHNIQUE:  CT angiogram of the chest performed during the pulmonary arterial phase of enhancement after the administration of 100 mL of Omnipaque 350 intravenous contrast.  3D reconstruction and 2D multiplanar reformations were also sent to PACS with the primary data set. FINDINGS: VASCULAR Quality: Good opacification of the pulmonary arteries. PE Assessment: No evidence for pulmonary embolism. Main pulmonary artery: Not significantly dilated. Thoracic aorta: No thoracic aortic aneurysm. Heart/pericardium: Size within normal limits.  No pericardial effusion. AIRWAYS, LUNGS AND PLEURA Airways: Central airways are patent. Lungs: Bibasilar atelectasis/scarring, similar to the prior CT. The lung parenchyma is otherwise unremarkable. Pleura: No pleural effusion, thickening, or pneumothorax. CHEST OTHERWISE Neck Base/Thyroid: No mass. Mediastinum/Lymph nodes: No enlarged lymph nodes or masses. Esophagus: No pathologic distension or obvious mass. SUPPORT DEVICES: None. UPPER ABDOMEN: Unremarkable. BONES/SOFT TISSUES: No significant lesion.     IMPRESSION: No evidence for a pulmonary embolism or acute pulmonary disease otherwise. Electronically Signed by: Balwinder Ash MD Signed on: 12/30/2023 11:36 PM Created on Workstation ID: [id number] Signed on Workstation ID: [id number]    XR FEMUR 2 OR MORE VIEWS LEFT    Result Date: 12/30/2023  EXAM: XR FEMUR 2 OR MORE VIEWS LEFT DATE: 12/30/2023 9:45 PM CLINICAL  INFORMATION: L Femur pain after fall. ? Fx COMPARISON: None available. FINDINGS: Mid to distal left femoral diaphyseal fracture with 100% overlap and displacement. Distal aspect of the femur is situated anterior and O blight laterally compared to the proximal aspect of the femur. Fracture is slightly comminuted. Overlap is estimated at least 8 cm. Associated soft tissue swelling. The hip and knee joint are without focal abnormality.     IMPRESSION: 1.   Displaced comminuted and overlapping mid/distal diaphyseal femoral fracture 2.   No other significant osseous or soft tissue abnormality. Electronically Signed by: Inder Martinez MD Signed on: 12/30/2023 10:25 PM Created on Workstation ID: [id number] Signed on Workstation ID: [id number]    XR CHEST AP OR PA    Result Date: 12/30/2023  EXAM: XR CHEST AP OR PA DATE: 12/30/2023 9:44 PM HISTORY: Dyspnea, hypoxemia after fall. ?  Pneumothorax COMPARISON: 12/14/2022 FINDINGS: The cardiovascular silhouette and vasculature are normal. The lung volumes are normal. There is no effusion or consolidation. There is no pneumothorax.  The regional skeleton is unremarkable. Presumed left basilar atelectasis     IMPRESSION: 1. Presumed atelectasis Electronically Signed by: Inder Martinez MD Signed on: 12/30/2023 10:24 PM Created on Workstation ID: [id number] Signed on Workstation ID: [id number]        Discharge Exam:  Vitals 24-Hour Range Most Recent Value   Temperature Temp  Min: 98.2 °F (36.8 °C)  Max: 98.7 °F (37.1 °C) 98.7 °F (37.1 °C)   Pulse Pulse  Min: 74  Max: 83 74   Respiratory Resp  Min: 16  Max: 18 17   Blood Pressure BP  Min: 101/64  Max: 126/71 116/75   Pulse Oximetry SpO2  Min: 92 %  Max: 97 %    O2 No data recorded      Vitals Most Recent Value First Value   Weight 86 kg (189 lb 9.5 oz) Weight: 83.6 kg (184 lb 4.9 oz)   Height 5' 9\" (175.3 cm) Height: 5' 9\" (175.3 cm)       General:  well developed, well nourished and no apparent distress  CV:  regular rate  and rhythm  Resp:  clear to auscultation bilaterally  Abd:  soft  Ext:  no rashes, lesions, or ulcers noted left leg splinted.  Patient is able to move left toes and dorsiflex plantar flex left ankle.  Sensation intact to left foot.  Good skin color.  Warm and dry to the touch.    Discharge Labs:  Recent Labs   Lab 01/03/24  0504 01/02/24  0442 01/01/24  1103 01/01/24  0442 12/31/23  0853 12/31/23  0452 12/30/23  2137   SODIUM  --  141 144  --   --  135 135   POTASSIUM  --  3.9 4.0  --  4.4 4.5 3.5   CHLORIDE  --  106 108  --   --  101 98   CO2  --  27 25  --   --  23 20*   BUN  --  11 13  --   --  14 16   CREATININE  --  0.62 0.65  --   --  0.63 0.61   GLUCOSE  --  105* 131*  --   --  128* 134*   MG  --   --   --   --   --   --  2.0   WBC  --   --   --  7.8  --  12.5* 32.6*   HGB 8.5* 8.7*  --  10.0*  10.0*  --  12.6 13.1   HCT 25.0* 26.4*  --  30.1*  30.1*  --  38.8 38.2   PLT  --   --   --  249  --  301 316       Microbiology Results       None            Code Status:  Full Resuscitation    Allergies:  ALLERGIES:  No Known Allergies    Immunizations:   Immunization History   Administered Date(s) Administered    COVID Pfizer 12Y+ (Requires Dilution) 04/10/2021, 04/30/2021    Influenza, High Dose quadrivalent, preserve-free 11/09/2022    Pneumococcal Conjugate 20 Valent Vacc (Prevnar 20) 11/09/2022    Tdap 06/26/2014       Patient Discharge Instructions/Orders:   May have pass with family, friends or staff with meds.  May have dental, eye, podiatry and or audiologist consult PRN with resident/designee permission.  May have tetanus vaccine if not given in past 10 years.  May have annual flu vaccine.  May have pneumovax or Prevnar 13 and influenza vaccine per nursing home protocol.  May have 2-step TB testing per facility policy.      Activity:  non weight bearing left leg    Evaluations:  PT Evaluation & Treatment and OT Evaluation & Treatment       Medication Protocol for Skilled Nursing Home Stay  Generic  medication will be dispensed unless the words \"NO SUBSTITUTES\" are written.  Hold medication up to 30 days if admitted to hospital or on pass.  Acetaminophen 325 mg 2 tabs by mouth every 4 hours PRN pain or fever (max 4 grams/daily).  May crush medications as needed unless contraindicated (exceptions per pharmacist).  May use liquid medications if necessary.    Bowel Medications:  Milk of Magnesia 10 mL concentrate or 30 mL suspension by mouth daily PRN for constipation (Do not give for patients with creatinine >2.5).  Polyethylene glycol (MiraLAX) 17G packet, dissolved in 8 ounces of water daily p.r.n. constipation.  Bisacodyl suppository rectally daily PRN constipation.  Tap water enema or Fleets enema rectally daily PRN constipation.    Blood Glucose monitoring:  None  - Fingerstick blood sugar PRN s/s of hyperglycemia/hypoglycemia    GLUCOSE CALL PARAMETERS:  If glucose greater than 450 at any time point.  If glucose greater than 300 on 2 consecutive glucose tests.  If glucose less than 80 and not responding to hypoglycemic treatment protocol.  If glucose less than 45 at any time point.    FAX GLUCOSE LOG AND INSULIN DOSES/DIABETES MEDICATIONS TO PCP (OR ENDO IF ENDO IS FOLLOWING):  If blood glucose is less than 80 for 2 readings at the same time point in 1 week (e.g. 2 glucose readings less than 80 at lunch time in 1 week).  If blood glucose is greater than 300 for 2 readings at any time point in 1 week (e.g. 2 glucose readings greater than 300 at any meal time or HS in 1 week).  If blood glucose is less than 45 at any time point, please fax glucose log and insulin doses the next business day to PCP and/or Endo.  Fax blood glucose log and insulin dosages to PCP (or Endo if Endo following) weekly after hospital discharge until further instructions.    Call physician for:   SBP less than 90 or greater than 180  DBP greater than 100  Pulse less than 50 or greater than 120    Digoxin Therapy:  Hold if heart rate  less than 60.    Weights:  Weekly     CHF Patients - Daily weight:  Call for weight gain of 3 lbs/2 days or 5 lbs in 1 week for heart failure patients.  All weights in the AM prior to eating/drinking.     Diet:  General  Consistency:  Regular  Liquid Diet Recommendations:  regular/no change   Recommended daily fluid intake or fluid restrictions:  No    Tube Feedings or Supplements:  None    Wound Care:  keep wound clean and dry  Central Line/PICC line care:  Per protocol.  Skin/Wound care:  Per wound care nurse recommendations and nursing home protocols.   Ostomy Products:  None    Diagnosis for Mcmahon Cath:  no mcmahon catheter in place  Mcmahon Cath Care:  None    Oxygen:  room air    Labs:  CBC 1/8    - I certify that post hospital care is required on daily basis for skilled nursing care or other rehab services that, as a practical matter, can only be provided in a SNF on an inpatient basis, and the SNF care is needed for a condition for which this individual received inpatient hospital care prior to transfer.     It is medically predictable that this patient will require skilled rehab services in the future.    Communicable disease status:   none    Free from tuberculosis & apparent communicable disease:  Yes    Short-term Stay: <1 months with rehab leading to discharge    Discharge discussed with patient and RN      Follow-up with:    Follow-up:  [facility]  [address] [place] [address]  [phone]  Go to   As needed, If symptoms worsen    Balwinder Thompson DO  [address] [place] [address]  [phone]    Follow up on 1/12/2024  Note sent to Ortho Scheduling Pool. Office will call pt to schedule follow up.      Follow up with PCP provider within 7 days of SNF discharge.     Time spent on discharge - 50 minutes, and more than 50% of time spent on counseling and coordination of care.    Signed:  Balwinder Yu DO  1/4/2024    5:35 AM        98.9